# Patient Record
Sex: MALE | HISPANIC OR LATINO | URBAN - METROPOLITAN AREA
[De-identification: names, ages, dates, MRNs, and addresses within clinical notes are randomized per-mention and may not be internally consistent; named-entity substitution may affect disease eponyms.]

---

## 2023-11-04 ENCOUNTER — HOSPITAL ENCOUNTER (EMERGENCY)
Facility: HOSPITAL | Age: 43
Discharge: HOME | End: 2023-11-04
Attending: EMERGENCY MEDICINE

## 2023-11-04 VITALS
OXYGEN SATURATION: 98 % | WEIGHT: 145 LBS | DIASTOLIC BLOOD PRESSURE: 79 MMHG | TEMPERATURE: 98 F | BODY MASS INDEX: 24.16 KG/M2 | RESPIRATION RATE: 20 BRPM | HEART RATE: 66 BPM | HEIGHT: 65 IN | SYSTOLIC BLOOD PRESSURE: 131 MMHG

## 2023-11-04 DIAGNOSIS — H16.002 CORNEAL ULCER OF LEFT EYE: Primary | ICD-10-CM

## 2023-11-04 DIAGNOSIS — T15.02XA RUST RING OF LEFT CORNEA DUE TO METALLIC FOREIGN BODY: ICD-10-CM

## 2023-11-04 PROCEDURE — 25000003 PHARM REV CODE 250

## 2023-11-04 PROCEDURE — 99283 EMERGENCY DEPT VISIT LOW MDM: CPT

## 2023-11-04 PROCEDURE — 99284 EMERGENCY DEPT VISIT MOD MDM: CPT

## 2023-11-04 RX ORDER — PROPARACAINE HYDROCHLORIDE 5 MG/ML
2 SOLUTION/ DROPS OPHTHALMIC
Status: COMPLETED | OUTPATIENT
Start: 2023-11-04 | End: 2023-11-04

## 2023-11-04 RX ORDER — MOXIFLOXACIN 5 MG/ML
1 SOLUTION/ DROPS OPHTHALMIC 4 TIMES DAILY
Qty: 9 ML | Refills: 0 | Status: SHIPPED | OUTPATIENT
Start: 2023-11-04 | End: 2023-12-13

## 2023-11-04 RX ORDER — MOXIFLOXACIN 5 MG/ML
1 SOLUTION/ DROPS OPHTHALMIC
Status: COMPLETED | OUTPATIENT
Start: 2023-11-04 | End: 2023-11-04

## 2023-11-04 RX ORDER — ACETAMINOPHEN 500 MG
500 TABLET ORAL EVERY 6 HOURS PRN
Qty: 28 TABLET | Refills: 0 | Status: SHIPPED | OUTPATIENT
Start: 2023-11-04 | End: 2023-12-13

## 2023-11-04 RX ORDER — IBUPROFEN 800 MG/1
800 TABLET ORAL EVERY 6 HOURS PRN
Qty: 20 TABLET | Refills: 0 | Status: SHIPPED | OUTPATIENT
Start: 2023-11-04 | End: 2023-12-13

## 2023-11-04 RX ADMIN — FLUORESCEIN SODIUM 1 EACH: 1 STRIP OPHTHALMIC at 02:11

## 2023-11-04 RX ADMIN — PROPARACAINE HYDROCHLORIDE 2 DROP: 5 SOLUTION/ DROPS OPHTHALMIC at 02:11

## 2023-11-04 RX ADMIN — MOXIFLOXACIN HYDROCHLORIDE 1 DROP: 5 SOLUTION/ DROPS OPHTHALMIC at 02:11

## 2023-11-04 NOTE — ED PROVIDER NOTES
Encounter Date: 11/4/2023    SCRIBE #1 NOTE: I, Evie Irene, am scribing for, and in the presence of,  Reji Varela PA-C. I have scribed the following portions of the note - Other sections scribed: HPI, ROS.       History     Chief Complaint   Patient presents with    Eye Injury     Patient reports thinks a piece of metal flew into left eye, when working. Has irritation no blurred vision      This is a 44 year old male, with no pertinent PMHx, who presents to the ED complaining of Left Eye Injury, symptoms onset yesterday.     Patient states he believes a piece of metal flew into his eye while working; was wearing protective goggles.  He was using an angle  on a [place] piece of metal.  Patient also states he can still see out of his eye. No other alleviating or exacerbating factors. Patient denies any other complaints. Patient did attempt to remove the object from his eye at home but was unable to.  He did not attempt treatment or medications pta. This is the extent of the patient's complaints in the ED. NKDA.     The history is provided by the patient. The history is limited by a language barrier. A  was used ([id number]).     Review of patient's allergies indicates:  No Known Allergies  History reviewed. No pertinent past medical history.  History reviewed. No pertinent surgical history.  History reviewed. No pertinent family history.     Review of Systems   Constitutional:  Negative for chills and fever.   HENT:  Negative for congestion and sore throat.    Eyes:  Positive for pain and redness. Negative for photophobia, discharge, itching and visual disturbance.   Respiratory:  Negative for cough and shortness of breath.    Cardiovascular:  Negative for chest pain.   Gastrointestinal:  Negative for abdominal pain, diarrhea, nausea and vomiting.   Genitourinary:  Negative for dysuria and hematuria.   Musculoskeletal:  Negative for back pain.   Skin:  Negative for rash.   Neurological:   Negative for headaches.   Psychiatric/Behavioral:  Negative for confusion.        Physical Exam     Initial Vitals [11/04/23 1355]   BP Pulse Resp Temp SpO2   124/78 75 18 98.3 °F (36.8 °C) 98 %      MAP       --         Physical Exam    Nursing note and vitals reviewed.  Constitutional: Vital signs are normal. He appears well-developed and well-nourished. He is cooperative. He does not appear ill. No distress.   HENT:   Head: Normocephalic and atraumatic.   Right Ear: External ear normal.   Left Ear: External ear normal.   Nose: Nose normal.   Mouth/Throat: Oropharynx is clear and moist.   Eyes: Conjunctivae and EOM are normal. Pupils are equal, round, and reactive to light.   Slit lamp exam:       The left eye shows corneal flare and corneal ulcer. The left eye shows no foreign body.   Pupils equal round reactive to light.  Extraocular movements intact.  Negative Carlotta sign.  No significant discharge.  There is a pterygium on the left.  There is also a corneal ulcer with a residual rust ring at approximately 2 o'clock on the left cornea.  Does not appear to be in the visual axis.  No foreign body.   Neck: Phonation normal. Neck supple.   Normal range of motion.  Cardiovascular:  Normal rate, regular rhythm, normal heart sounds and intact distal pulses.           No murmur heard.  Pulmonary/Chest: Effort normal and breath sounds normal. No respiratory distress.   Abdominal: Abdomen is soft and flat. He exhibits no distension. There is no abdominal tenderness.   Musculoskeletal:      Cervical back: Normal range of motion and neck supple.     Neurological: He is alert and oriented to person, place, and time. GCS eye subscore is 4. GCS verbal subscore is 5. GCS motor subscore is 6.   Skin: Skin is warm and dry. Capillary refill takes less than 2 seconds. No rash noted.         ED Course   Procedures  Labs Reviewed - No data to display       Imaging Results    None          Medications   fluorescein ophthalmic strip 1  each (1 each Left Eye Given by Provider 11/4/23 2784)   proparacaine 0.5 % ophthalmic solution 2 drop (2 drops Left Eye Given by Provider 11/4/23 2360)   moxifloxacin 0.5 % ophthalmic solution 1 drop (1 drop Left Eye Given 11/4/23 3415)     Medical Decision Making  44-year-old male presenting to the emergency department with a chief complaint of left eye pain redness.  He was using an angle  on a piece of [place] metal yesterday when he felt something go into his eye despite wearing safety goggles.  Attempted removal at home, unsure if he was successful or not.  Denies any visual loss or visual disturbance.  Physical exam, clinically well-appearing and in no acute distress.  Left eye is red.  There is a pterygium on the left.  There is also a very small corneal ulcer with a residual rust ring.  Negative Carlotta sign.    Differential diagnosis includes but is not limited to allergic, viral, or bacterial conjunctivitis, hordeolum, chalazion cyst, corneal abrasion, corneal ulcer, foreign body.    Slit-lamp examination consistent with corneal ulcer, residual rust ring.  I did not see a foreign body.  Brushed over the surface of the cornea with a moist sterile cotton-tipped applicator.  Did not remove any foreign body, the cotton swab did not catch on any foreign body.  I doubt any residual foreign body.  There is a residual rust ring.  Due to apparent corneal ulcer, I will start the patient on moxifloxacin eyedrops.  Discussed the importance of taking this medication 4 times per day until he follows up with Ophthalmology.  Urgent referral was placed.  Ophthalmology was not available at this location.  If patient has any worsening symptoms, visual loss, decrease in visual acuity, swelling around the eye, or fever, he will go to the [facility] or [facility] where Ophthalmology is on-call.  Patient voices understanding and is comfortable with this plan of care.  First dose of Vigamox  administered in the emergency department.  Vigamox, Motrin, Tylenol electronically prescribed and sent to the patient's preferred pharmacy.    Return precautions were discussed, all patient questions were answered, and the patient was agreeable to the plan of care.  He was discharged home in stable condition and will follow up with his primary care provider or return to the emergency department if his symptoms worsen or do not improve.     Risk  OTC drugs.  Prescription drug management.  Diagnosis or treatment significantly limited by social determinants of health.            Scribe Attestation:   Scribe #1: I performed the above scribed service and the documentation accurately describes the services I performed. I attest to the accuracy of the note.                I, Reji Varela PA-C, personally performed the services described in this documentation. All medical record entries made by the scribe were at my direction and in my presence. I have reviewed the chart and agree that the record reflects my personal performance and is accurate and complete.       Clinical Impression:   Final diagnoses:  [H16.002] Corneal ulcer of left eye (Primary)  [T15.02XA] Rust ring of left cornea due to metallic foreign body        ED Disposition Condition    Discharge Stable          ED Prescriptions       Medication Sig Dispense Start Date End Date Auth. Provider    moxifloxacin (VIGAMOX) 0.5 % ophthalmic solution Place 1 drop into the right eye 4 (four) times daily. for 10 days 9 mL 11/4/2023 11/14/2023 Reji Varela PA-C    ibuprofen (ADVIL,MOTRIN) 800 MG tablet Take 1 tablet (800 mg total) by mouth every 6 (six) hours as needed for Pain. 20 tablet 11/4/2023 -- Reji Varela PA-C    acetaminophen (TYLENOL) 500 MG tablet Take 1 tablet (500 mg total) by mouth every 6 (six) hours as needed. 28 tablet 11/4/2023 11/11/2023 Reji Varela PA-C          Follow-up Information       Follow up With Specialties Details Why  Contact Info    [facility] -  Schedule an appointment as soon as possible for a visit  As needed [address]  [place] [address]  [phone]      [facility]  Go to  If symptoms worsen [address] [facility] [address]  [phone]             Reji Varela, PA-C  11/04/23 1508

## 2023-11-04 NOTE — ED NOTES
"Left eye injury yesterday afternoon while working at home. Pt states he "believes it was a piece of metal that flew into my eye." Pt states he bought over the counter eyedrops but is unable to states the name of the drops. Pt also reports that wife was "trying to remove piece and my eye got more irritated." Pt reports blurred vision on left eye, redness and teary eye present on arrival. Pt denies headaches.   "

## 2023-11-04 NOTE — DISCHARGE INSTRUCTIONS
